# Patient Record
Sex: FEMALE | Race: WHITE | Employment: FULL TIME | URBAN - METROPOLITAN AREA
[De-identification: names, ages, dates, MRNs, and addresses within clinical notes are randomized per-mention and may not be internally consistent; named-entity substitution may affect disease eponyms.]

---

## 2019-05-15 ENCOUNTER — HOSPITAL ENCOUNTER (OUTPATIENT)
Age: 64
Discharge: HOME | End: 2019-05-17
Payer: COMMERCIAL

## 2019-05-15 PROCEDURE — 88175 CYTOPATH C/V AUTO FLUID REDO: CPT

## 2020-05-27 ENCOUNTER — HOSPITAL ENCOUNTER (OUTPATIENT)
Age: 65
Discharge: HOME | End: 2020-05-29
Payer: COMMERCIAL

## 2020-05-27 PROCEDURE — 88175 CYTOPATH C/V AUTO FLUID REDO: CPT

## 2023-08-17 ENCOUNTER — HOSPITAL ENCOUNTER (OUTPATIENT)
Age: 68
Discharge: HOME | End: 2023-08-19
Payer: MEDICARE

## 2023-08-17 ENCOUNTER — HOSPITAL ENCOUNTER (OUTPATIENT)
Dept: ULTRASOUND IMAGING | Age: 68
Discharge: HOME | End: 2023-08-19
Payer: MEDICARE

## 2023-08-17 DIAGNOSIS — D37.04: ICD-10-CM

## 2023-08-17 PROCEDURE — 76999 ECHO EXAMINATION PROCEDURE: CPT

## 2023-09-26 NOTE — PROGRESS NOTES
[address] PRE-ADMISSION TESTING INSTRUCTIONS    The Preadmission Testing patient is instructed accordingly using the following criteria (check applicable):    ARRIVAL INSTRUCTIONS:  [x] Parking the day of Surgery is located in the Main Entrance lot. Upon entering the door, make an immediate right to the surgery reception desk    [x] Bring photo ID and insurance card    [x] Bring in a copy of Living will or Durable Power of  papers. [x] Please be sure to arrange for responsible adult to provide transportation to and from the hospital    [x] Please arrange for responsible adult to be with you for the 24 hour period post procedure due to having anesthesia    [x] If you awake am of surgery not feeling well or have temperature >100 please call [phone]    GENERAL INSTRUCTIONS:    [x] Nothing by mouth after midnight, including gum, candy, mints or water    [x] You may brush your teeth, but do not swallow any water    [] Take medications as instructed with 1-2 oz of water    [x] Stop herbal supplements and vitamins 5 days prior to procedure    [] Follow preop dosing of blood thinners per physician instructions    [] Take 1/2 dose of evening insulin, but no insulin after midnight    [] No oral diabetic medications after midnight    [] If diabetic and have low blood sugar or feel symptomatic, take 1-2oz apple juice only    [] Bring inhalers day of surgery    [] Bring C-PAP/ Bi-Pap day of surgery    [] Bring urine specimen day of surgery    [x] Shower or bath with soap, lather and rinse well, AM of Surgery, no lotion, powders or creams to surgical site    [] Follow bowel prep as instructed per surgeon    [x] No tobacco products within 24 hours of surgery     [x] No alcohol or illegal drug use within 24 hours of surgery.     [x] Jewelry, body piercing's, eyeglasses, contact lenses and dentures are not permitted into surgery (bring cases)      [x] Please do not wear any nail polish, make up or hair products on the day of surgery    [x] You can expect a call the business day prior to procedure to notify you if your arrival time changes    [] If you receive a survey after surgery we would greatly appreciate your comments    [] Parent/guardian of a minor must accompany their child and remain on the premises  the entire time they are under our care     [] Pediatric patients may bring favorite toy, blanket or comfort item with them    [] A caregiver or family member must remain with the patient during their stay if they are mentally handicapped, have dementia, disoriented or unable to use a call light or would be a safety concern if left unattended    [x] Please notify surgeon if you develop any illness between now and time of surgery (cold, cough, sore throat, fever, nausea, vomiting) or any signs of infections  including skin, wounds, and dental.    [x]  The Outpatient Pharmacy is available to fill your prescription here on your day of surgery, ask your preop nurse for details    [] Other instructions    EDUCATIONAL MATERIALS PROVIDED:    [] PAT Preoperative Education Packet/Booklet     [] Medication List    [] Transfusion bracelet applied with instructions    [] Shower with soap, lather and rinse well, and use CHG wipes provided the evening before surgery as instructed    [] Incentive spirometer with instructions

## 2023-09-27 ENCOUNTER — ANESTHESIA EVENT (OUTPATIENT)
Dept: OPERATING ROOM | Age: 68
End: 2023-09-27
Payer: MEDICARE

## 2023-09-28 ENCOUNTER — HOSPITAL ENCOUNTER (OUTPATIENT)
Age: 68
Setting detail: OUTPATIENT SURGERY
Discharge: HOME | End: 2023-09-28
Attending: OTOLARYNGOLOGY | Admitting: OTOLARYNGOLOGY
Payer: MEDICARE

## 2023-09-28 ENCOUNTER — ANESTHESIA (OUTPATIENT)
Dept: OPERATING ROOM | Age: 68
End: 2023-09-28
Payer: MEDICARE

## 2023-09-28 VITALS
OXYGEN SATURATION: 96 % | RESPIRATION RATE: 20 BRPM | HEIGHT: 61 IN | DIASTOLIC BLOOD PRESSURE: 73 MMHG | WEIGHT: 165 LBS | TEMPERATURE: 97.8 F | HEART RATE: 94 BPM | BODY MASS INDEX: 31.15 KG/M2 | SYSTOLIC BLOOD PRESSURE: 159 MMHG

## 2023-09-28 DIAGNOSIS — D37.032 NEOPLASM OF UNCERTAIN BEHAVIOR OF SUBMANDIBULAR GLAND: ICD-10-CM

## 2023-09-28 DIAGNOSIS — G89.18 POST-OP PAIN: Primary | ICD-10-CM

## 2023-09-28 LAB
EKG ATRIAL RATE: 94 BPM
EKG P AXIS: 45 DEGREES
EKG P-R INTERVAL: 148 MS
EKG Q-T INTERVAL: 368 MS
EKG QRS DURATION: 76 MS
EKG QTC CALCULATION (BAZETT): 460 MS
EKG R AXIS: 27 DEGREES
EKG T AXIS: 28 DEGREES
EKG VENTRICULAR RATE: 94 BPM

## 2023-09-28 PROCEDURE — 88342 IMHCHEM/IMCYTCHM 1ST ANTB: CPT

## 2023-09-28 PROCEDURE — 3600000012 HC SURGERY LEVEL 2 ADDTL 15MIN: Performed by: OTOLARYNGOLOGY

## 2023-09-28 PROCEDURE — 2500000003 HC RX 250 WO HCPCS: Performed by: NURSE ANESTHETIST, CERTIFIED REGISTERED

## 2023-09-28 PROCEDURE — C1729 CATH, DRAINAGE: HCPCS | Performed by: OTOLARYNGOLOGY

## 2023-09-28 PROCEDURE — 88307 TISSUE EXAM BY PATHOLOGIST: CPT

## 2023-09-28 PROCEDURE — 6370000000 HC RX 637 (ALT 250 FOR IP)

## 2023-09-28 PROCEDURE — 7100000001 HC PACU RECOVERY - ADDTL 15 MIN: Performed by: OTOLARYNGOLOGY

## 2023-09-28 PROCEDURE — 6360000002 HC RX W HCPCS: Performed by: OTOLARYNGOLOGY

## 2023-09-28 PROCEDURE — 3600000002 HC SURGERY LEVEL 2 BASE: Performed by: OTOLARYNGOLOGY

## 2023-09-28 PROCEDURE — 6360000002 HC RX W HCPCS: Performed by: NURSE ANESTHETIST, CERTIFIED REGISTERED

## 2023-09-28 PROCEDURE — 7100000010 HC PHASE II RECOVERY - FIRST 15 MIN: Performed by: OTOLARYNGOLOGY

## 2023-09-28 PROCEDURE — 88341 IMHCHEM/IMCYTCHM EA ADD ANTB: CPT

## 2023-09-28 PROCEDURE — 7100000000 HC PACU RECOVERY - FIRST 15 MIN: Performed by: OTOLARYNGOLOGY

## 2023-09-28 PROCEDURE — 88331 PATH CONSLTJ SURG 1 BLK 1SPC: CPT

## 2023-09-28 PROCEDURE — 3700000001 HC ADD 15 MINUTES (ANESTHESIA): Performed by: OTOLARYNGOLOGY

## 2023-09-28 PROCEDURE — 2500000003 HC RX 250 WO HCPCS: Performed by: OTOLARYNGOLOGY

## 2023-09-28 PROCEDURE — 3700000000 HC ANESTHESIA ATTENDED CARE: Performed by: OTOLARYNGOLOGY

## 2023-09-28 PROCEDURE — 93005 ELECTROCARDIOGRAM TRACING: CPT

## 2023-09-28 PROCEDURE — 7100000011 HC PHASE II RECOVERY - ADDTL 15 MIN: Performed by: OTOLARYNGOLOGY

## 2023-09-28 PROCEDURE — 2720000010 HC SURG SUPPLY STERILE: Performed by: OTOLARYNGOLOGY

## 2023-09-28 PROCEDURE — 2580000003 HC RX 258: Performed by: OTOLARYNGOLOGY

## 2023-09-28 PROCEDURE — 2709999900 HC NON-CHARGEABLE SUPPLY: Performed by: OTOLARYNGOLOGY

## 2023-09-28 RX ORDER — SODIUM CHLORIDE 0.9 % (FLUSH) 0.9 %
5-40 SYRINGE (ML) INJECTION EVERY 12 HOURS SCHEDULED
Status: DISCONTINUED | OUTPATIENT
Start: 2023-09-28 | End: 2023-09-28 | Stop reason: HOSPADM

## 2023-09-28 RX ORDER — SODIUM CHLORIDE 0.9 % (FLUSH) 0.9 %
5-40 SYRINGE (ML) INJECTION PRN
Status: DISCONTINUED | OUTPATIENT
Start: 2023-09-28 | End: 2023-09-28 | Stop reason: HOSPADM

## 2023-09-28 RX ORDER — ONDANSETRON 2 MG/ML
4 INJECTION INTRAMUSCULAR; INTRAVENOUS
Status: DISCONTINUED | OUTPATIENT
Start: 2023-09-28 | End: 2023-09-28 | Stop reason: HOSPADM

## 2023-09-28 RX ORDER — CEPHALEXIN 500 MG/1
500 CAPSULE ORAL 3 TIMES DAILY
Qty: 21 CAPSULE | Refills: 0 | Status: SHIPPED | OUTPATIENT
Start: 2023-09-28 | End: 2023-10-05

## 2023-09-28 RX ORDER — SODIUM CHLORIDE 9 MG/ML
INJECTION, SOLUTION INTRAVENOUS PRN
Status: DISCONTINUED | OUTPATIENT
Start: 2023-09-28 | End: 2023-09-28 | Stop reason: HOSPADM

## 2023-09-28 RX ORDER — MIDAZOLAM HYDROCHLORIDE 2 MG/2ML
2 INJECTION, SOLUTION INTRAMUSCULAR; INTRAVENOUS
Status: DISCONTINUED | OUTPATIENT
Start: 2023-09-28 | End: 2023-09-28 | Stop reason: HOSPADM

## 2023-09-28 RX ORDER — HYDRALAZINE HYDROCHLORIDE 20 MG/ML
10 INJECTION INTRAMUSCULAR; INTRAVENOUS
Status: DISCONTINUED | OUTPATIENT
Start: 2023-09-28 | End: 2023-09-28 | Stop reason: HOSPADM

## 2023-09-28 RX ORDER — ONDANSETRON 2 MG/ML
INJECTION INTRAMUSCULAR; INTRAVENOUS PRN
Status: DISCONTINUED | OUTPATIENT
Start: 2023-09-28 | End: 2023-09-28 | Stop reason: SDUPTHER

## 2023-09-28 RX ORDER — HYDROCODONE BITARTRATE AND ACETAMINOPHEN 5; 325 MG/1; MG/1
1 TABLET ORAL EVERY 6 HOURS PRN
Qty: 12 TABLET | Refills: 0 | Status: SHIPPED | OUTPATIENT
Start: 2023-09-28 | End: 2023-10-01

## 2023-09-28 RX ORDER — HYDROCODONE BITARTRATE AND ACETAMINOPHEN 5; 325 MG/1; MG/1
TABLET ORAL
Status: COMPLETED
Start: 2023-09-28 | End: 2023-09-28

## 2023-09-28 RX ORDER — ROCURONIUM BROMIDE 10 MG/ML
INJECTION, SOLUTION INTRAVENOUS PRN
Status: DISCONTINUED | OUTPATIENT
Start: 2023-09-28 | End: 2023-09-28 | Stop reason: SDUPTHER

## 2023-09-28 RX ORDER — MEPERIDINE HYDROCHLORIDE 25 MG/ML
12.5 INJECTION INTRAMUSCULAR; INTRAVENOUS; SUBCUTANEOUS EVERY 5 MIN PRN
Status: DISCONTINUED | OUTPATIENT
Start: 2023-09-28 | End: 2023-09-28 | Stop reason: HOSPADM

## 2023-09-28 RX ORDER — IPRATROPIUM BROMIDE AND ALBUTEROL SULFATE 2.5; .5 MG/3ML; MG/3ML
1 SOLUTION RESPIRATORY (INHALATION)
Status: DISCONTINUED | OUTPATIENT
Start: 2023-09-28 | End: 2023-09-28 | Stop reason: HOSPADM

## 2023-09-28 RX ORDER — LIDOCAINE HYDROCHLORIDE AND EPINEPHRINE 10; 10 MG/ML; UG/ML
INJECTION, SOLUTION INFILTRATION; PERINEURAL PRN
Status: DISCONTINUED | OUTPATIENT
Start: 2023-09-28 | End: 2023-09-28 | Stop reason: ALTCHOICE

## 2023-09-28 RX ORDER — HYDROCODONE BITARTRATE AND ACETAMINOPHEN 5; 325 MG/1; MG/1
1 TABLET ORAL ONCE
Status: DISCONTINUED | OUTPATIENT
Start: 2023-09-28 | End: 2023-09-28 | Stop reason: HOSPADM

## 2023-09-28 RX ORDER — LIDOCAINE HYDROCHLORIDE 20 MG/ML
INJECTION, SOLUTION EPIDURAL; INFILTRATION; INTRACAUDAL; PERINEURAL PRN
Status: DISCONTINUED | OUTPATIENT
Start: 2023-09-28 | End: 2023-09-28 | Stop reason: SDUPTHER

## 2023-09-28 RX ORDER — DEXAMETHASONE SODIUM PHOSPHATE 4 MG/ML
INJECTION, SOLUTION INTRA-ARTICULAR; INTRALESIONAL; INTRAMUSCULAR; INTRAVENOUS; SOFT TISSUE PRN
Status: DISCONTINUED | OUTPATIENT
Start: 2023-09-28 | End: 2023-09-28 | Stop reason: SDUPTHER

## 2023-09-28 RX ORDER — SODIUM CHLORIDE, SODIUM LACTATE, POTASSIUM CHLORIDE, CALCIUM CHLORIDE 600; 310; 30; 20 MG/100ML; MG/100ML; MG/100ML; MG/100ML
INJECTION, SOLUTION INTRAVENOUS CONTINUOUS
Status: DISCONTINUED | OUTPATIENT
Start: 2023-09-28 | End: 2023-09-28 | Stop reason: HOSPADM

## 2023-09-28 RX ORDER — ONDANSETRON 4 MG/1
4 TABLET, FILM COATED ORAL 3 TIMES DAILY PRN
Qty: 15 TABLET | Refills: 0 | Status: SHIPPED | OUTPATIENT
Start: 2023-09-28

## 2023-09-28 RX ORDER — FENTANYL CITRATE 50 UG/ML
INJECTION, SOLUTION INTRAMUSCULAR; INTRAVENOUS PRN
Status: DISCONTINUED | OUTPATIENT
Start: 2023-09-28 | End: 2023-09-28 | Stop reason: SDUPTHER

## 2023-09-28 RX ORDER — PHENYLEPHRINE HCL IN 0.9% NACL 1 MG/10 ML
SYRINGE (ML) INTRAVENOUS PRN
Status: DISCONTINUED | OUTPATIENT
Start: 2023-09-28 | End: 2023-09-28 | Stop reason: SDUPTHER

## 2023-09-28 RX ORDER — SUCCINYLCHOLINE/SOD CL,ISO/PF 200MG/10ML
SYRINGE (ML) INTRAVENOUS PRN
Status: DISCONTINUED | OUTPATIENT
Start: 2023-09-28 | End: 2023-09-28 | Stop reason: SDUPTHER

## 2023-09-28 RX ORDER — PROPOFOL 10 MG/ML
INJECTION, EMULSION INTRAVENOUS PRN
Status: DISCONTINUED | OUTPATIENT
Start: 2023-09-28 | End: 2023-09-28 | Stop reason: SDUPTHER

## 2023-09-28 RX ORDER — LABETALOL HYDROCHLORIDE 5 MG/ML
10 INJECTION, SOLUTION INTRAVENOUS
Status: DISCONTINUED | OUTPATIENT
Start: 2023-09-28 | End: 2023-09-28 | Stop reason: HOSPADM

## 2023-09-28 RX ORDER — MIDAZOLAM HYDROCHLORIDE 1 MG/ML
INJECTION INTRAMUSCULAR; INTRAVENOUS PRN
Status: DISCONTINUED | OUTPATIENT
Start: 2023-09-28 | End: 2023-09-28 | Stop reason: SDUPTHER

## 2023-09-28 RX ADMIN — WATER 2000 MG: 1 INJECTION INTRAMUSCULAR; INTRAVENOUS; SUBCUTANEOUS at 07:50

## 2023-09-28 RX ADMIN — Medication 200 MCG: at 08:57

## 2023-09-28 RX ADMIN — FENTANYL CITRATE 50 MCG: 50 INJECTION, SOLUTION INTRAMUSCULAR; INTRAVENOUS at 08:31

## 2023-09-28 RX ADMIN — HYDROCODONE BITARTRATE AND ACETAMINOPHEN 1 TABLET: 5; 325 TABLET ORAL at 10:48

## 2023-09-28 RX ADMIN — DEXAMETHASONE SODIUM PHOSPHATE 10 MG: 4 INJECTION, SOLUTION INTRAMUSCULAR; INTRAVENOUS at 08:03

## 2023-09-28 RX ADMIN — MIDAZOLAM 2 MG: 1 INJECTION INTRAMUSCULAR; INTRAVENOUS at 07:46

## 2023-09-28 RX ADMIN — FENTANYL CITRATE 100 MCG: 50 INJECTION, SOLUTION INTRAMUSCULAR; INTRAVENOUS at 07:57

## 2023-09-28 RX ADMIN — Medication 120 MG: at 07:57

## 2023-09-28 RX ADMIN — Medication 100 MCG: at 08:18

## 2023-09-28 RX ADMIN — Medication 100 MCG: at 08:14

## 2023-09-28 RX ADMIN — PROPOFOL 30 MG: 10 INJECTION, EMULSION INTRAVENOUS at 08:08

## 2023-09-28 RX ADMIN — Medication 200 MCG: at 08:42

## 2023-09-28 RX ADMIN — SODIUM CHLORIDE: 9 INJECTION, SOLUTION INTRAVENOUS at 08:50

## 2023-09-28 RX ADMIN — ROCURONIUM BROMIDE 5 MG: 10 INJECTION, SOLUTION INTRAVENOUS at 07:57

## 2023-09-28 RX ADMIN — ONDANSETRON 4 MG: 2 INJECTION INTRAMUSCULAR; INTRAVENOUS at 09:17

## 2023-09-28 RX ADMIN — SODIUM CHLORIDE: 9 INJECTION, SOLUTION INTRAVENOUS at 07:41

## 2023-09-28 RX ADMIN — LIDOCAINE HYDROCHLORIDE 100 MG: 20 INJECTION, SOLUTION EPIDURAL; INFILTRATION; INTRACAUDAL; PERINEURAL at 07:57

## 2023-09-28 RX ADMIN — Medication 100 MCG: at 08:53

## 2023-09-28 RX ADMIN — PROPOFOL 150 MG: 10 INJECTION, EMULSION INTRAVENOUS at 07:57

## 2023-09-28 RX ADMIN — Medication 100 MCG: at 09:17

## 2023-09-28 RX ADMIN — Medication 200 MCG: at 08:47

## 2023-09-28 RX ADMIN — Medication 100 MCG: at 09:15

## 2023-09-28 ASSESSMENT — PAIN DESCRIPTION - LOCATION
LOCATION: NECK
LOCATION: NECK

## 2023-09-28 ASSESSMENT — PAIN SCALES - GENERAL
PAINLEVEL_OUTOF10: 0
PAINLEVEL_OUTOF10: 5
PAINLEVEL_OUTOF10: 6
PAINLEVEL_OUTOF10: 0

## 2023-09-28 ASSESSMENT — PAIN DESCRIPTION - FREQUENCY
FREQUENCY: CONTINUOUS
FREQUENCY: CONTINUOUS

## 2023-09-28 ASSESSMENT — PAIN DESCRIPTION - DESCRIPTORS
DESCRIPTORS: ACHING
DESCRIPTORS: ACHING

## 2023-09-28 ASSESSMENT — PAIN DESCRIPTION - ORIENTATION
ORIENTATION: LEFT
ORIENTATION: LEFT

## 2023-09-28 ASSESSMENT — PAIN DESCRIPTION - PAIN TYPE
TYPE: SURGICAL PAIN
TYPE: SURGICAL PAIN

## 2023-09-28 NOTE — DISCHARGE INSTRUCTIONS
ENT Post-Op Instructions    Follow up with Dr. Alessandro Salomon in 4 days, Monday 10/2. CALL OFFICE TO SCHEDULE/CONFIRM APPOINTMENT    Please follow the instructions below:    DIET INSTRUCTIONS:  Regular diet. Start with light meals today and increase as tolerated. ACTIVITY INSTRUCTIONS:  Increase activity as tolerated    Elevate Head of bed   No heavy lifting or strenuous activity until your cleared at your post-operative appointment   No driving while taking pain medication    WOUND/DRESSING INSTRUCTIONS:  May shower normally in 24 hours from time of surgery. May shower from the neck down tonight. Ice to areas of pain. You have sutures that dissolve and do not need to be removed. You have steri-trip bandages (white bandages) over your incision. Leave these in place. Do not remove them. They will fall off in about 1 week. They will curl and peel at the edges, this is normal. They are OK to get wet, but do not soak. Blot dry, do not scrub. Once the steri strips have fallen off or been removed in the office, place antibiotic ointment on the incision twice a day for 1-2 weeks. OK to use scar cream after 2 weeks. Use sunscreen when going out in the sun for the first 6 months. Be careful to not extend your head backwards for a few weeks, this puts tension on the incision line and can cause more scarring. MEDICATION INSTRUCTIONS:  Take medication as prescribed. When taking pain medications, you may experience dizziness or drowsiness. Do not drink alcohol or drive when taking these medications. You may take Ibuprofen (over the counter) as per directions for mild pain. Do not take any other acetaminophen (Tylenol) products while taking your pain medication. You may experience constipation while taking narcotic pain medication - You may take over the counter stool softeners: docuscate (Colace) or sennosides S (Senokot - S).      Call physician for any of the following or for questions/concerns:   Fever over

## 2023-09-28 NOTE — PROGRESS NOTES
1020 admitted to  28 iv cont family at bedside nourishment offered, neuro chesck completed face symmetrical and tongue midline

## 2023-09-28 NOTE — PROGRESS NOTES
INTRAOPERATIVE CONSULTATION (with FROZEN SECTION)   A. Left submandibular mass, excision: Atypical lymphoid proliferation (tissue saved for flow); Benign salivary gland tissue also present.

## 2023-09-28 NOTE — BRIEF OP NOTE
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]    Post-Op Diagnosis: Same       Procedure(s):  EXCISION LEFT SUBMANDIBULAR GLAND MASS WITH NERVE INTEGRITY MONITOR    Surgeon(s):  Lex Prescott MD    Assistant:  Resident: Devyn Saab DO    Anesthesia: General    Estimated Blood Loss (mL): 90PS    Complications: None    Specimens:   ID Type Source Tests Collected by Time Destination   A : LEFT SUBMANDIBULAR MASS Tissue Tissue SURGICAL PATHOLOGY Lex Prescott MD 9/28/2023 4116        Implants:  * No implants in log *      Drains:   Closed/Suction Drain Left Other (Comment) Bulb (Active)       Findings: large lymph node necrotic superifical and abutting submandibular gland       Electronically signed by Devyn Saab DO on 9/28/2023 at 9:41 AM

## 2023-09-28 NOTE — PROGRESS NOTES
CLINICAL PHARMACY NOTE: MEDS TO BEDS    Total # of Prescriptions Filled: 3   The following medications were delivered to the patient:  Zofran 4 mg   Norco 5/325 mg  Keflex 500 mg       Additional Documentation:

## 2023-09-28 NOTE — H&P
H&P reviewed, no changes. No issues. Questions and concerns were answered to the patient's satisfaction.  Will proceed with procedure    Will discuss with attending     Electronically signed by Alfredo Downs DO on 9/28/23 at 6:47 AM EDT

## 2023-09-28 NOTE — PROGRESS NOTES
1113 very detailed instructions given to pt and family verbalizes understanding.  Schedule givne to pt and family and andreina emptied for family

## 2023-09-29 NOTE — OP NOTE
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]    Post-Op Diagnosis: Same       Procedure(s):  EXCISION LEFT SUBMANDIBULAR GLAND MASS WITH NERVE INTEGRITY MONITOR    Surgeon(s):  Albert Andrews MD    Assistant:   Resident: Jud Vick DO    Anesthesia: General    Estimated Blood Loss (mL): 93DC    Complications: None    Specimens:   ID Type Source Tests Collected by Time Destination   A : LEFT SUBMANDIBULAR MASS Tissue Tissue SURGICAL PATHOLOGY Albert Andrews MD 9/28/2023 5831        Implants:  * No implants in log *      Drains:   Closed/Suction Drain Left Other (Comment) Bulb (Active)   Site Description Clean, dry & intact 09/28/23 1020   Dressing Status Clean, dry & intact 09/28/23 1020   Drainage Appearance Bloody 09/28/23 1020   Drain Status To bulb suction; Compressed 09/28/23 1020   Output (ml) 10 ml 09/28/23 1010       Findings: large lymph node necrotic superifical and abutting submandibular gland      INDICATIONS: This is a 76 y.o. female who had had left submandibular fullness, hard node, FNA inconclusive. The risks, benefits, and  alternatives to the procedure were discussed with the patient including but not limited to bleeding infection damage to adjacent structures marginal mandibular nerve injury facial weakness, pt understood and elected to  proceed. DESCRIPTION OF PROCEDURE: Informed consent was obtained. Correct patient  was identified and brought to the OR and placed supine. SCDs were applied. General anesthesia was initiated. A shoulder roll was  placed and the neck was placed in hyperextension with good exposure of the  left neck.  The NIM needles were then placed in position of the lower face (lower and upper lip) to monitor the marginal mandibular nerve The skin and subcutaneous tissues overlying the procedure without complication. Patient handed back to anesthesia for wakening.  Dr Mirta Stokes was present and scrubbed for entire case     Electronically signed by Bibi Vasquez DO on 9/29/2023 at 8:56 AM

## 2023-09-29 NOTE — ANESTHESIA POSTPROCEDURE EVALUATION
Department of Anesthesiology  Postprocedure Note    Patient: Natanael Redman  MRN: [id number]  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     Jessica Phase I: Jessica Score: 10    Jessica Phase II: Jessica Score: 10      Anesthesia Post Evaluation    Patient location during evaluation: PACU  Patient participation: complete - patient participated  Level of consciousness: awake  Airway patency: patent  Nausea & Vomiting: no nausea and no vomiting  Complications: no  Cardiovascular status: hemodynamically stable  Respiratory status: acceptable  Hydration status: stable  Pain management: adequate

## 2023-09-30 LAB
SEND OUT REPORT: NORMAL
TEST NAME: NORMAL

## 2023-10-03 LAB — SURGICAL PATHOLOGY REPORT: NORMAL

## 2023-11-16 ENCOUNTER — HOSPITAL ENCOUNTER (OUTPATIENT)
Dept: RADIATION ONCOLOGY | Age: 68
Discharge: HOME | End: 2023-11-16
Payer: MEDICARE

## 2023-11-16 ENCOUNTER — TELEPHONE (OUTPATIENT)
Dept: CASE MANAGEMENT | Age: 68
End: 2023-11-16

## 2023-11-16 VITALS
TEMPERATURE: 97.1 F | SYSTOLIC BLOOD PRESSURE: 107 MMHG | DIASTOLIC BLOOD PRESSURE: 49 MMHG | HEART RATE: 95 BPM | RESPIRATION RATE: 20 BRPM | WEIGHT: 171.4 LBS | BODY MASS INDEX: 32.39 KG/M2

## 2023-11-16 DIAGNOSIS — C82.11 GRADE 2 FOLLICULAR LYMPHOMA OF LYMPH NODES OF NECK (HCC): ICD-10-CM

## 2023-11-16 PROCEDURE — 99205 OFFICE O/P NEW HI 60 MIN: CPT

## 2023-11-16 PROCEDURE — 99205 OFFICE O/P NEW HI 60 MIN: CPT | Performed by: RADIOLOGY

## 2023-11-16 RX ORDER — MECLIZINE HYDROCHLORIDE 25 MG/1
25 TABLET ORAL 3 TIMES DAILY PRN
COMMUNITY

## 2023-11-16 NOTE — TELEPHONE ENCOUNTER
Met with patient during her initial consultation with Dr. Masood Santos for radiation therapy for her follicular lymphoma cancer diagnosis. Introduced myself and explained my role with cancer patients receiving treatment within our cancer centers. Patient was friendly and receptive. States that all of her questions and concerns were fully addressed during her consultation appointment with the radiation therapy nurse and physician. Denies any problems with insurance coverage for medication or transportation for the daily treatments. Reviewed additional ancillary services available at the center. Denies any referral needs at this time. She follows with Dr. Nima De Jesus at [facility] and does not require any chemotherapy. She will be contacted for her CT simulation. Patient provided with written literature of [facility]: Radiation Therapy: What It Is, How It Helps and Oncology Prehab sheet. Provided patient with my contact information, office hours, and encouragement to call me with questions or concerns. Patient verbalizes understanding and appreciative of nurse navigator visit. Emotional support provided and greater than 25 minutes spent with patient. Nurse navigator will continue to follow.  HUMERA GibbsN, RN, Oncology Nurse Navigator

## 2023-11-17 PROBLEM — C82.11 GRADE 2 FOLLICULAR LYMPHOMA OF LYMPH NODES OF NECK (HCC): Status: ACTIVE | Noted: 2023-11-17

## 2023-11-30 ENCOUNTER — HOSPITAL ENCOUNTER (OUTPATIENT)
Dept: RADIATION ONCOLOGY | Age: 68
Discharge: HOME | End: 2023-11-30
Payer: MEDICARE

## 2023-11-30 ENCOUNTER — HOSPITAL ENCOUNTER (OUTPATIENT)
Dept: CT IMAGING | Age: 68
Discharge: HOME | End: 2023-12-02

## 2023-11-30 PROCEDURE — 77290 THER RAD SIMULAJ FIELD CPLX: CPT | Performed by: RADIOLOGY

## 2023-11-30 PROCEDURE — 77334 RADIATION TREATMENT AID(S): CPT | Performed by: RADIOLOGY

## 2023-12-12 ENCOUNTER — HOSPITAL ENCOUNTER (OUTPATIENT)
Dept: RADIATION ONCOLOGY | Age: 68
Discharge: HOME | End: 2023-12-12
Payer: MEDICARE

## 2023-12-12 PROCEDURE — 77300 RADIATION THERAPY DOSE PLAN: CPT | Performed by: RADIOLOGY

## 2023-12-12 PROCEDURE — 77301 RADIOTHERAPY DOSE PLAN IMRT: CPT | Performed by: RADIOLOGY

## 2023-12-12 PROCEDURE — 77338 DESIGN MLC DEVICE FOR IMRT: CPT | Performed by: RADIOLOGY

## 2023-12-18 ENCOUNTER — APPOINTMENT (OUTPATIENT)
Dept: RADIATION ONCOLOGY | Age: 68
End: 2023-12-18
Payer: MEDICARE

## 2023-12-19 ENCOUNTER — APPOINTMENT (OUTPATIENT)
Dept: RADIATION ONCOLOGY | Age: 68
End: 2023-12-19
Payer: MEDICARE

## 2023-12-20 ENCOUNTER — APPOINTMENT (OUTPATIENT)
Dept: RADIATION ONCOLOGY | Age: 68
End: 2023-12-20
Payer: MEDICARE

## 2023-12-21 ENCOUNTER — APPOINTMENT (OUTPATIENT)
Dept: RADIATION ONCOLOGY | Age: 68
End: 2023-12-21
Payer: MEDICARE

## 2023-12-22 ENCOUNTER — APPOINTMENT (OUTPATIENT)
Dept: RADIATION ONCOLOGY | Age: 68
End: 2023-12-22
Payer: MEDICARE

## 2023-12-26 ENCOUNTER — APPOINTMENT (OUTPATIENT)
Dept: RADIATION ONCOLOGY | Age: 68
End: 2023-12-26
Payer: MEDICARE

## 2023-12-26 PROCEDURE — G6002 STEREOSCOPIC X-RAY GUIDANCE: HCPCS | Performed by: RADIOLOGY

## 2023-12-26 PROCEDURE — 77386 HC NTSTY MODUL RAD TX DLVR CPLX: CPT | Performed by: RADIOLOGY

## 2023-12-27 ENCOUNTER — HOSPITAL ENCOUNTER (OUTPATIENT)
Dept: RADIATION ONCOLOGY | Age: 68
Discharge: HOME | End: 2023-12-27
Payer: MEDICARE

## 2023-12-27 ENCOUNTER — APPOINTMENT (OUTPATIENT)
Dept: RADIATION ONCOLOGY | Age: 68
End: 2023-12-27
Payer: MEDICARE

## 2023-12-27 VITALS
WEIGHT: 170 LBS | BODY MASS INDEX: 32.12 KG/M2 | RESPIRATION RATE: 18 BRPM | DIASTOLIC BLOOD PRESSURE: 76 MMHG | HEART RATE: 85 BPM | SYSTOLIC BLOOD PRESSURE: 141 MMHG

## 2023-12-27 DIAGNOSIS — C82.11 GRADE 2 FOLLICULAR LYMPHOMA OF LYMPH NODES OF NECK (HCC): Primary | ICD-10-CM

## 2023-12-27 PROCEDURE — G6002 STEREOSCOPIC X-RAY GUIDANCE: HCPCS | Performed by: RADIOLOGY

## 2023-12-27 PROCEDURE — 77386 HC NTSTY MODUL RAD TX DLVR CPLX: CPT | Performed by: RADIOLOGY

## 2023-12-27 NOTE — PROGRESS NOTES
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 Past medical, surgical, social and family histories reviewed and updated as indicated. PAIN: No    ALLERGIES:  Macrodantin [nitrofurantoin macrocrystal] and Sulfa antibiotics         Current Outpatient Medications   Medication Sig Dispense Refill    meclizine (ANTIVERT) 25 MG tablet Take 1 tablet by mouth 3 times daily as needed for Dizziness      ondansetron (ZOFRAN) 4 MG tablet Take 1 tablet by mouth 3 times daily as needed for Nausea or Vomiting 15 tablet 0    vitamin D (CHOLECALCIFEROL) 25 MCG (1000 UT) TABS tablet Take 1 tablet by mouth daily      pravastatin (PRAVACHOL) 40 MG tablet Take 1 tablet by mouth daily       No current facility-administered medications for this encounter. OBJECTIVE:  Alert and fully ambulatory. Pleasant and conversant. Physical Examination:General appearance - alert, well appearing, and in no distress and normal appearing weight:  Constitutional: A well developed, well nourished 76 y.o. female who is alert, oriented, cooperative and in no apparent distress. HEENT:   Skin:  Warm and dry. No obvious rashes. Vitals:    12/27/23 0841 12/27/23 0857   BP:  (!) 141/76   Pulse:  85   Resp:  18   Weight: 77.1 kg (170 lb) 77.1 kg (170 lb)       Wt Readings from Last 3 Encounters:   12/27/23 77.1 kg (170 lb)   12/20/23 77 kg (169 lb 12.8 oz)   11/16/23 77.7 kg (171 lb 6.4 oz)       ASSESSMENT/PLAN:     Patient is tolerating treatments well with expected toxicities. Current and planned dose reviewed. Goals of treatment and potential side effects were reviewed with the patient.  Treatment imaging has been personally

## 2023-12-28 ENCOUNTER — APPOINTMENT (OUTPATIENT)
Dept: RADIATION ONCOLOGY | Age: 68
End: 2023-12-28
Payer: MEDICARE

## 2023-12-28 PROCEDURE — 77386 HC NTSTY MODUL RAD TX DLVR CPLX: CPT | Performed by: RADIOLOGY

## 2023-12-29 ENCOUNTER — APPOINTMENT (OUTPATIENT)
Dept: RADIATION ONCOLOGY | Age: 68
End: 2023-12-29
Payer: MEDICARE

## 2023-12-29 PROCEDURE — 77386 HC NTSTY MODUL RAD TX DLVR CPLX: CPT | Performed by: RADIOLOGY

## 2024-01-02 ENCOUNTER — HOSPITAL ENCOUNTER (OUTPATIENT)
Dept: RADIATION ONCOLOGY | Age: 69
Discharge: HOME | End: 2024-01-02
Payer: MEDICARE

## 2024-01-02 PROCEDURE — 77427 RADIATION TX MANAGEMENT X5: CPT | Performed by: RADIOLOGY

## 2024-01-02 PROCEDURE — 77336 RADIATION PHYSICS CONSULT: CPT | Performed by: RADIOLOGY

## 2024-01-02 PROCEDURE — G6002 STEREOSCOPIC X-RAY GUIDANCE: HCPCS | Performed by: RADIOLOGY

## 2024-01-02 PROCEDURE — 77386 HC NTSTY MODUL RAD TX DLVR CPLX: CPT | Performed by: RADIOLOGY

## 2024-01-03 ENCOUNTER — HOSPITAL ENCOUNTER (OUTPATIENT)
Dept: RADIATION ONCOLOGY | Age: 69
Discharge: HOME | End: 2024-01-03
Payer: MEDICARE

## 2024-01-03 VITALS
DIASTOLIC BLOOD PRESSURE: 66 MMHG | HEART RATE: 83 BPM | BODY MASS INDEX: 32.35 KG/M2 | WEIGHT: 171.2 LBS | RESPIRATION RATE: 18 BRPM | TEMPERATURE: 97.1 F | SYSTOLIC BLOOD PRESSURE: 140 MMHG

## 2024-01-03 DIAGNOSIS — C82.11 GRADE 2 FOLLICULAR LYMPHOMA OF LYMPH NODES OF NECK (HCC): Primary | ICD-10-CM

## 2024-01-03 PROCEDURE — G6002 STEREOSCOPIC X-RAY GUIDANCE: HCPCS | Performed by: RADIOLOGY

## 2024-01-03 PROCEDURE — 77386 HC NTSTY MODUL RAD TX DLVR CPLX: CPT | Performed by: RADIOLOGY

## 2024-01-03 NOTE — PROGRESS NOTES
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      Past medical, surgical, social and family histories reviewed and updated as indicated.    PAIN: No    ALLERGIES:  Macrodantin [nitrofurantoin macrocrystal] and Sulfa antibiotics         Current Outpatient Medications   Medication Sig Dispense Refill    meclizine (ANTIVERT) 25 MG tablet Take 1 tablet by mouth 3 times daily as needed for Dizziness      ondansetron (ZOFRAN) 4 MG tablet Take 1 tablet by mouth 3 times daily as needed for Nausea or Vomiting 15 tablet 0    vitamin D (CHOLECALCIFEROL) 25 MCG (1000 UT) TABS tablet Take 1 tablet by mouth daily      pravastatin (PRAVACHOL) 40 MG tablet Take 1 tablet by mouth daily       No current facility-administered medications for this encounter.         OBJECTIVE:  Alert and fully ambulatory. Pleasant and conversant.      Physical Examination:General appearance - alert, well appearing, and in no distress and normal appearing weight:  Constitutional: A well developed, well nourished 68 y.o. female who is alert, oriented, cooperative and in no apparent distress.  HEENT:   Skin:  Warm and dry.  No obvious rashes.    Vitals:    01/03/24 0847   BP: (!) 140/66   Pulse: 83   Resp: 18   Temp: 97.1 °F (36.2 °C)   TempSrc: Temporal   Weight: 77.7 kg (171 lb 3.2 oz)       Wt Readings from Last 3 Encounters:   01/03/24 77.7 kg (171 lb 3.2 oz)   12/27/23 77.1 kg (170 lb)   12/20/23 77 kg (169 lb 12.8 oz)       ASSESSMENT/PLAN:     Patient is tolerating treatments well with expected toxicities.    Current and planned dose reviewed. Goals of treatment and potential side effects were reviewed with the patient. Treatment imaging has

## 2024-01-03 NOTE — PROGRESS NOTES
Lawanda VALENCIA Steffany  1/3/2024  Wt Readings from Last 3 Encounters:   01/03/24 77.7 kg (171 lb 3.2 oz)   12/27/23 77.1 kg (170 lb)   12/20/23 77 kg (169 lb 12.8 oz)     Body mass index is 32.35 kg/m².        Treatment Area:Left    Patient was seen today for weekly visit.   Comfort Alteration  KPS:90%  Fatigue: None    Mucous Membrane Alteration  Mucositis Due to Radiation: No  Thrush: No  Voice Changes: No    Nutritional Alteration  Anorexia: No   Nausea: No   Vomiting: No     Skin Alteration   Sensation:none    Radiation Dermatitis:  none    Ventilation Alteration  Cough:No    Emotional  Coping: effective    Injury, potential bleeding or infection: none    Radioprotectant Tolerance  Yes            No results found for: \"WBC\", \"HGB\", \"HCT\", \"PLT\"      BP (!) 140/66   Pulse 83   Temp 97.1 °F (36.2 °C) (Temporal)   Resp 18   Wt 77.7 kg (171 lb 3.2 oz)   BMI 32.35 kg/m²   BP within normal range? yes       Assessment/Plan: Completed 11/12 fractions; 2200/2400 cGy    Rachel Chan RN

## 2024-01-04 ENCOUNTER — HOSPITAL ENCOUNTER (OUTPATIENT)
Dept: RADIATION ONCOLOGY | Age: 69
Discharge: HOME | End: 2024-01-04
Payer: MEDICARE

## 2024-01-04 ENCOUNTER — CLINICAL DOCUMENTATION (OUTPATIENT)
Dept: RADIATION ONCOLOGY | Age: 69
End: 2024-01-04

## 2024-01-04 PROCEDURE — G6002 STEREOSCOPIC X-RAY GUIDANCE: HCPCS | Performed by: RADIOLOGY

## 2024-01-04 PROCEDURE — 77386 HC NTSTY MODUL RAD TX DLVR CPLX: CPT | Performed by: RADIOLOGY

## 2024-01-04 NOTE — PROGRESS NOTES
Lawanda Jeter  1/4/2024  7:43 AM          Current Outpatient Medications   Medication Sig Dispense Refill    meclizine (ANTIVERT) 25 MG tablet Take 1 tablet by mouth 3 times daily as needed for Dizziness      ondansetron (ZOFRAN) 4 MG tablet Take 1 tablet by mouth 3 times daily as needed for Nausea or Vomiting 15 tablet 0    vitamin D (CHOLECALCIFEROL) 25 MCG (1000 UT) TABS tablet Take 1 tablet by mouth daily      pravastatin (PRAVACHOL) 40 MG tablet Take 1 tablet by mouth daily       No current facility-administered medications for this encounter.          This is an up-to-date medication list.    Please take this list to your next care provider, and discard any previous medication lists.

## 2024-01-19 ENCOUNTER — HOSPITAL ENCOUNTER (OUTPATIENT)
Dept: RADIATION ONCOLOGY | Age: 69
Discharge: HOME | End: 2024-01-19
Payer: MEDICARE

## 2024-01-19 VITALS
HEART RATE: 90 BPM | DIASTOLIC BLOOD PRESSURE: 70 MMHG | RESPIRATION RATE: 18 BRPM | TEMPERATURE: 97.7 F | SYSTOLIC BLOOD PRESSURE: 147 MMHG | BODY MASS INDEX: 31.82 KG/M2 | WEIGHT: 168.4 LBS

## 2024-01-19 DIAGNOSIS — C82.11 GRADE 2 FOLLICULAR LYMPHOMA OF LYMPH NODES OF NECK (HCC): Primary | ICD-10-CM

## 2024-01-19 DIAGNOSIS — T66.XXXA ADVERSE EFFECT OF RADIATION, INITIAL ENCOUNTER: Primary | ICD-10-CM

## 2024-01-19 RX ORDER — BETAMETHASONE DIPROPIONATE 0.05 %
OINTMENT (GRAM) TOPICAL
Qty: 50 G | Refills: 1 | Status: SHIPPED | OUTPATIENT
Start: 2024-01-19

## 2024-01-19 NOTE — PROGRESS NOTES
Lawanda Jeter  1/19/2024  10:11 AM      Vitals:    01/19/24 1007   BP: (!) 147/70   Pulse: 90   Resp: 18   Temp: 97.7 °F (36.5 °C)    :    Wt Readings from Last 3 Encounters:   01/19/24 76.4 kg (168 lb 6.4 oz)   01/03/24 77.7 kg (171 lb 3.2 oz)   12/27/23 77.1 kg (170 lb)                Current Outpatient Medications:     meclizine (ANTIVERT) 25 MG tablet, Take 1 tablet by mouth 3 times daily as needed for Dizziness, Disp: , Rfl:     ondansetron (ZOFRAN) 4 MG tablet, Take 1 tablet by mouth 3 times daily as needed for Nausea or Vomiting, Disp: 15 tablet, Rfl: 0    vitamin D (CHOLECALCIFEROL) 25 MCG (1000 UT) TABS tablet, Take 1 tablet by mouth daily, Disp: , Rfl:     pravastatin (PRAVACHOL) 40 MG tablet, Take 1 tablet by mouth daily, Disp: , Rfl:       Patient is seen today in follow up for Skin check     Lawanda is here today with her  for skin check per Dr Carrasco's recommendations.  She is doing well, she does have redness and rash to neck RT site, Dr Pickens is ordering betamethasone and Bactroban.  No other complaints.        FALLS RISK SCREENING ASSESSMENT    Instructions:  Assess the patient and enter the appropriate indicators that are present for fall risk identification.   Total the numbers entered and assign a fall risk score from Table 2.  Reassess patient at a minimum every 12 weeks or with status change.    Assessment   Date  1/19/2024     1.  Mental Ability: confusion/cognitively impaired No - 0       2.  Elimination Issues: incontinence, frequency No - 0       3.  Ambulatory: use of assistive devices (walker, cane, off-loading devices), attached to equipment (IV pole, oxygen) No - 0     4.  Sensory Limitations: dizziness, vertigo, impaired vision Yes=3       5.  Age 65 years or greater - 1       6.  Medication: diuretics, strong analgesics, hypnotics, sedatives, antihypertensive agents   No - 0   7.  Falls:  recent history of falls within the last 3 months (not to include slipping or 
°C)   TempSrc: Temporal   Weight: 76.4 kg (168 lb 6.4 oz)       Wt Readings from Last 3 Encounters:   01/19/24 76.4 kg (168 lb 6.4 oz)   01/03/24 77.7 kg (171 lb 3.2 oz)   12/27/23 77.1 kg (170 lb)       General: Well developed, no acute distress.  HEENT: Normocephalic and atraumatic. Moist mucosae.  Neck: No thyromegaly. Trachea at midline. No lymphadenopathy.  Cardiorespiratory: Unlabored breathing. No edema.  Abdomen: Nontender and nondistended. No hepatosplenomegaly appreciated.  Back: No tenderness to palpation.  Neuro: CNs grossly intact. Spontaneous movement with all limbs.  Psych: Normal affect. Alert & oriented x3.  Skin: No abnormal lesions throughout.    ASSESSMENT/PLAN:      I have prescribed topical antibiotics and steroids.  New appointment in 2 weeks.    Simi Pickens MD    Department of Radiation Oncology  [facility]: [phone] (FAX: [phone])  [facility]: [phone] (FAX: [phone])  [facility]:  [phone] (FAX:  [phone])

## 2024-02-01 ENCOUNTER — HOSPITAL ENCOUNTER (OUTPATIENT)
Dept: RADIATION ONCOLOGY | Age: 69
Discharge: HOME | End: 2024-02-01

## 2024-02-01 VITALS
BODY MASS INDEX: 31.48 KG/M2 | DIASTOLIC BLOOD PRESSURE: 64 MMHG | TEMPERATURE: 96.6 F | WEIGHT: 166.6 LBS | SYSTOLIC BLOOD PRESSURE: 123 MMHG | HEART RATE: 84 BPM | RESPIRATION RATE: 18 BRPM

## 2024-02-01 DIAGNOSIS — C82.11 GRADE 2 FOLLICULAR LYMPHOMA OF LYMPH NODES OF NECK (HCC): Primary | ICD-10-CM

## 2024-02-01 NOTE — PROGRESS NOTES
DEPARTMENT OF RADIATION ONCOLOGY   Follow up visit        2024    NAME:  Lawanda Jeter    :  1955 68 y.o. female     PCP: Emelia Rowan DO    DIAGNOSIS:  1. 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  No signs of recurrence at the level of the neck.    Past medical, surgical, social and family histories reviewed and updated as indicated.    ALLERGIES:  Macrodantin [nitrofurantoin macrocrystal] and Sulfa antibiotics       MEDICATIONS:   Current Outpatient Medications:     betamethasone dipropionate 0.05 % ointment, Apply topically BID., Disp: 50 g, Rfl: 1    meclizine (ANTIVERT) 25 MG tablet, Take 1 tablet by mouth 3 times daily as needed for Dizziness, Disp: , Rfl:     ondansetron (ZOFRAN) 4 MG tablet, Take 1 tablet by mouth 3 times daily as needed for Nausea or Vomiting, Disp: 15 tablet, Rfl: 0    vitamin D (CHOLECALCIFEROL) 25 MCG (1000 UT) TABS tablet, Take 1 tablet by mouth daily, Disp: , Rfl:     pravastatin (PRAVACHOL) 40 MG tablet, Take 1 tablet by mouth daily, Disp: , Rfl:     REVIEW OF SYSTEMS: Obtained from the patient, chart review and nursing assessment. 10-point ROS reviewed and negative except as per above.    PHYSICAL EXAMINATION:    Vitals:    24 0803   BP: 123/64   Pulse: 84   Resp: 18   Temp: (!) 96.6 °F (35.9 °C)   TempSrc: Temporal   Weight: 75.6 kg (166 lb 9.6 oz)       Wt Readings from Last 3 Encounters:   24 75.6 kg (166 lb 9.6 oz)   24 76.4 kg (168 lb 6.4 oz)   24 77.7 kg (171 lb 
- 0       5.  Age 65 years or greater - 1       6.  Medication: diuretics, strong analgesics, hypnotics, sedatives, antihypertensive agents   No - 0   7.  Falls:  recent history of falls within the last 3 months (not to include slipping or tripping)   No - 0   TOTAL 1    If score of 4 or greater was education given? No       TABLE 2   Risk Score Risk Level Plan of Care   0-3 Little or  No Risk 1.  Provide assistance as indicated for ambulation activities  2.  Reorient confused/cognitively impaired patient  3.  Call-light/bell within patient's reach  4.  Chair/bed in low position, stretcher/bed with siderails up except when performing patient care activities  5.  Educate patient/family/caregiver on falls prevention  6.  Reassess in 12 weeks or with any noted change in patient condition which places them at a risk for a fall   4-6 Moderate Risk 1.  Provide assistance as indicated for ambulation activities  2.  Reorient confused/cognitively impaired patient  3.  Call-light/bell within patient's reach  4.  Chair/bed in low position, stretcher/bed with siderails up except when performing patient care activities  5.  Educate patient/family/caregiver on falls prevention  6.  Falls risk precaution (Yellow sticker Level II) placed on patient chart   7 or   Higher High Risk 1.  Place patient in easily observable treatment room  2.  Patient attended at all times by family member or staff  3.  Provide assistance as indicated for ambulation activities  4.  Reorient confused/cognitively impaired patient  5.  Call-light/bell within patient's reach  6.  Chair/bed in low position, stretcher/bed with siderails up except when performing patient care activities  7.  Educate patient/family/caregiver on falls prevention  8.  Falls risk precaution (Yellow sticker Level III) placed on patient chart           MALNUTRITION RISK SCREENING ASSESSMENT    2/1/2024   Patient:  Lawanda Jeter  Sex:  female    Instructions:  Assess the

## 2024-07-11 NOTE — PROGRESS NOTES
Radiation Treatment Summary    Patient Name:  Lawanda Jeter,  1955,  68 y.o., female       Referring Physician: No referring provider defined for this encounter.      PCP: Emelia Rowan DO       Diagnosis:Clinical stage I, non bulky, grade 1-2 follicular lymphoma of the left neck, SP excisional biopsy       Recent History: The patient underwent radiation for her stage I nonbulky grade 1 2 follicular lymphoma of the left neck.  That treatment was carried out from 12/18/2024 to 1/4/2024 for a total of 24 Gray in 12 fractions with IMRT.      Response/Tolerance: Tolerated the treatment very well    Follow-up:  30-day in the office with Radiation Oncology      Thank you for the opportunity to participate in multidisciplinary management of this remarkable and pleasant patient.      Simi Pickens MD    Department of Radiation Oncology  [facility] ([facility]: [phone] (FAX: [phone])  [facility] ([facility]: [phone] (FAX: [phone])  [facility]:  [phone] (FAX:  [phone])

## (undated) DEVICE — BLADE ES ELASTOMERIC COAT INSUL DURABLE BEND UPTO 90DEG

## (undated) DEVICE — MAGNETIC INSTR DRAPE 20X16: Brand: MEDLINE INDUSTRIES, INC.

## (undated) DEVICE — DISSECTOR ENDOSCP L21CM TIP CURVATURE 40DEG FN CRV JAW VES

## (undated) DEVICE — BASIC SINGLE BASIN 1-LF: Brand: MEDLINE INDUSTRIES, INC.

## (undated) DEVICE — PACK PROCEDURE SURG GEN CUST

## (undated) DEVICE — CORD,CAUTERY,BIPOLAR,STERILE: Brand: MEDLINE

## (undated) DEVICE — SOLUTION IRRIG 1000ML 0.9% SOD CHL USP POUR PLAS BTL

## (undated) DEVICE — DECANTER: Brand: UNBRANDED

## (undated) DEVICE — ELECTRODE ELECSURG NDL 2.8 INX7.2 CM COAT INSUL EDGE

## (undated) DEVICE — ELECTRODE PT RET AD L9FT HI MOIST COND ADH HYDRGEL CORDED

## (undated) DEVICE — GOWN,SIRUS,FABRNF,L,20/CS: Brand: MEDLINE

## (undated) DEVICE — NEEDLE HYPO 25GA L1.5IN BLU POLYPR HUB S STL REG BVL STR

## (undated) DEVICE — PROBE 8225101 5PK STD PRASS FL TIP ROHS

## (undated) DEVICE — SURGICAL PROCEDURE PACK EENT CUST

## (undated) DEVICE — SPONGE,LAP,4"X18",XR,ST,5/PK,40PK/CS: Brand: MEDLINE INDUSTRIES, INC.

## (undated) DEVICE — Device

## (undated) DEVICE — 4-PORT MANIFOLD: Brand: NEPTUNE 2

## (undated) DEVICE — SURGICAL NERVE STIMULATOR/LOCATOR: Brand: CHECKPOINT HEAD & NECK

## (undated) DEVICE — SYRINGE,CONTROL,LL,FINGER,GRIP: Brand: MEDLINE INDUSTRIES, INC.

## (undated) DEVICE — DRAIN SURG W7MMXL20CM SIL FULL PERF HUBLESS FLAT RADPQ STRP

## (undated) DEVICE — SYSTEM SURG HEMSTAT PWD 1 GM POLYSACCHARIDE HEMOSPHERES

## (undated) DEVICE — GLOVE ORANGE PI 7 1/2   MSG9075

## (undated) DEVICE — GAUZE,SPONGE,4"X4",8PLY,STRL,LF,10/TRAY: Brand: MEDLINE

## (undated) DEVICE — MARKER,SKIN,WI/RULER AND LABELS: Brand: MEDLINE

## (undated) DEVICE — COVER HNDL LT DISP

## (undated) DEVICE — TOWEL,OR,DSP,ST,BLUE,STD,6/PK,12PK/CS: Brand: MEDLINE